# Patient Record
Sex: FEMALE | Race: OTHER | Employment: OTHER | URBAN - METROPOLITAN AREA
[De-identification: names, ages, dates, MRNs, and addresses within clinical notes are randomized per-mention and may not be internally consistent; named-entity substitution may affect disease eponyms.]

---

## 2021-11-17 DIAGNOSIS — Z11.3 SCREENING FOR STD (SEXUALLY TRANSMITTED DISEASE): ICD-10-CM

## 2021-11-18 LAB
HAV IGM SER IA-ACNC: NORMAL
HEPATITIS B CORE IGM ANTIBODY: NORMAL
HEPATITIS B SURFACE ANTIGEN INTERPRETATION: NORMAL
HEPATITIS C ANTIBODY INTERPRETATION: NORMAL
HIV-1 AND HIV-2 ANTIBODIES: NORMAL

## 2023-11-14 ENCOUNTER — APPOINTMENT (OUTPATIENT)
Dept: GENERAL RADIOLOGY | Age: 43
End: 2023-11-14
Payer: MEDICARE

## 2023-11-14 ENCOUNTER — APPOINTMENT (OUTPATIENT)
Dept: ULTRASOUND IMAGING | Age: 43
End: 2023-11-14
Payer: MEDICARE

## 2023-11-14 ENCOUNTER — HOSPITAL ENCOUNTER (EMERGENCY)
Age: 43
Discharge: HOME | End: 2023-11-14
Payer: MEDICARE

## 2023-11-14 VITALS
BODY MASS INDEX: 23.05 KG/M2 | OXYGEN SATURATION: 100 % | HEIGHT: 64 IN | RESPIRATION RATE: 18 BRPM | TEMPERATURE: 98.2 F | WEIGHT: 135 LBS | SYSTOLIC BLOOD PRESSURE: 147 MMHG | HEART RATE: 75 BPM | DIASTOLIC BLOOD PRESSURE: 98 MMHG

## 2023-11-14 DIAGNOSIS — M79.632 PAIN OF LEFT FOREARM: ICD-10-CM

## 2023-11-14 DIAGNOSIS — M77.9 TENDONITIS: Primary | ICD-10-CM

## 2023-11-14 PROCEDURE — 93971 EXTREMITY STUDY: CPT

## 2023-11-14 PROCEDURE — 99284 EMERGENCY DEPT VISIT MOD MDM: CPT

## 2023-11-14 PROCEDURE — 73090 X-RAY EXAM OF FOREARM: CPT

## 2023-11-14 RX ORDER — NAPROXEN 500 MG/1
500 TABLET ORAL 2 TIMES DAILY
Qty: 14 TABLET | Refills: 0 | Status: SHIPPED | OUTPATIENT
Start: 2023-11-14 | End: 2023-11-21

## 2023-11-14 RX ORDER — ACETAMINOPHEN 325 MG/1
650 TABLET ORAL ONCE
Status: DISCONTINUED | OUTPATIENT
Start: 2023-11-14 | End: 2023-11-14 | Stop reason: HOSPADM

## 2023-11-14 ASSESSMENT — PAIN DESCRIPTION - ORIENTATION: ORIENTATION: LEFT

## 2023-11-14 ASSESSMENT — PAIN SCALES - GENERAL: PAINLEVEL_OUTOF10: 7

## 2023-11-14 ASSESSMENT — PAIN DESCRIPTION - LOCATION: LOCATION: ARM

## 2023-11-14 ASSESSMENT — PAIN - FUNCTIONAL ASSESSMENT: PAIN_FUNCTIONAL_ASSESSMENT: 0-10

## 2023-11-15 NOTE — ED PROVIDER NOTES
Independent ARTUR Visit. [facility]   ED  Encounter Note  Admit Date/RoomTime: 2023  6:33 PM  ED Room: ANGELA/ANGELA    NAME: Brody Adrian  : 1980  MRN: [id number]     Chief Complaint:  Arm Pain (Left arm. Bump to forearm, no known injury.)    History of Present Illness       Celso Trevino is a 44 y.o. old female who presents to the emergency department by private vehicle, for non-traumatic Left forearm pain which occured 1 day(s) prior to arrival.  States she was in her yard doing yard work when the pain started. States it hurts whenever she uses it. Denies fall or trauma. Patient has no prior history of pain/injury with regards to today's visit. She is right handed. Since onset the symptoms have been remaining constant. Her pain is aggraveated by any use of and relieved by nothing. She denies any head injury, headache, loss of consciousness, fever, chills, NV, tingling, confusion, dizziness, neck pain, chest pain, SOB, abdominal pain, back pain, numbness, weakness, blurred vision, nausea, vomiting, fever, chills, wounds, or rash. She denies being a diabetic. ROS   Pertinent positives and negatives are stated within HPI, all other systems reviewed and are negative. Past Medical History:  has a past medical history of Acute appendicitis, Asthma, Headache(784.0), Iron deficiency anemia secondary to inadequate dietary iron intake, and Migraine. Surgical History:  has a past surgical history that includes  section (); Upper gastrointestinal endoscopy (2012); laparoscopic appendectomy (N/A, 10/28/2019); and Appendectomy. Social History:  reports that she has quit smoking. She has never used smokeless tobacco. She reports that she does not drink alcohol and does not use drugs.     Family History: family history includes Breast Cancer in her maternal grandmother; Diabetes in her maternal grandmother and niece;

## 2023-11-29 ENCOUNTER — OFFICE VISIT (OUTPATIENT)
Dept: PRIMARY CARE CLINIC | Age: 43
End: 2023-11-29
Payer: MEDICARE

## 2023-11-29 VITALS
HEART RATE: 76 BPM | BODY MASS INDEX: 22.71 KG/M2 | DIASTOLIC BLOOD PRESSURE: 80 MMHG | WEIGHT: 133 LBS | RESPIRATION RATE: 19 BRPM | TEMPERATURE: 98.1 F | SYSTOLIC BLOOD PRESSURE: 138 MMHG | HEIGHT: 64 IN | OXYGEN SATURATION: 99 %

## 2023-11-29 DIAGNOSIS — M79.672 ACUTE PAIN OF LEFT FOOT: ICD-10-CM

## 2023-11-29 DIAGNOSIS — L60.0 INGROWN NAIL OF GREAT TOE OF LEFT FOOT: Primary | ICD-10-CM

## 2023-11-29 PROCEDURE — 99214 OFFICE O/P EST MOD 30 MIN: CPT | Performed by: EMERGENCY MEDICINE

## 2023-11-29 PROCEDURE — 1036F TOBACCO NON-USER: CPT | Performed by: EMERGENCY MEDICINE

## 2023-11-29 PROCEDURE — G8427 DOCREV CUR MEDS BY ELIG CLIN: HCPCS | Performed by: EMERGENCY MEDICINE

## 2023-11-29 PROCEDURE — G8420 CALC BMI NORM PARAMETERS: HCPCS | Performed by: EMERGENCY MEDICINE

## 2023-11-29 PROCEDURE — G8484 FLU IMMUNIZE NO ADMIN: HCPCS | Performed by: EMERGENCY MEDICINE

## 2023-11-29 RX ORDER — CLARITHROMYCIN 500 MG/1
500 TABLET, COATED ORAL 2 TIMES DAILY
Qty: 20 TABLET | Refills: 0 | Status: SHIPPED | OUTPATIENT
Start: 2023-11-29 | End: 2023-12-09

## 2023-11-29 ASSESSMENT — ENCOUNTER SYMPTOMS
DIARRHEA: 0
WHEEZING: 0
EYE REDNESS: 0
SHORTNESS OF BREATH: 0
COUGH: 0
EYE PAIN: 0
BACK PAIN: 0
VOMITING: 0
ABDOMINAL DISTENTION: 0
NAUSEA: 0
SINUS PRESSURE: 0
EYE DISCHARGE: 0
SORE THROAT: 0

## 2023-11-29 NOTE — PROGRESS NOTES
reevaluation and treatment. Pt verbalizes understanding and is in agreement with plan of care. All questions answered.       New Medications     New Prescriptions    CLARITHROMYCIN (BIAXIN) 500 MG TABLET    Take 1 tablet by mouth 2 times daily for 10 days   Moist warm soaks  Tylenol prn    Electronically signed by Shoshana Whyte DO   DD: 11/29/23

## 2023-12-03 ENCOUNTER — APPOINTMENT (OUTPATIENT)
Dept: GENERAL RADIOLOGY | Age: 43
End: 2023-12-03
Payer: MEDICARE

## 2023-12-03 ENCOUNTER — HOSPITAL ENCOUNTER (EMERGENCY)
Age: 43
Discharge: HOME | End: 2023-12-03
Payer: MEDICARE

## 2023-12-03 VITALS
TEMPERATURE: 97.9 F | HEIGHT: 64 IN | RESPIRATION RATE: 18 BRPM | SYSTOLIC BLOOD PRESSURE: 117 MMHG | HEART RATE: 68 BPM | OXYGEN SATURATION: 100 % | BODY MASS INDEX: 22.2 KG/M2 | WEIGHT: 130 LBS | DIASTOLIC BLOOD PRESSURE: 89 MMHG

## 2023-12-03 DIAGNOSIS — L60.0 INGROWN NAIL OF GREAT TOE OF LEFT FOOT: Primary | ICD-10-CM

## 2023-12-03 PROCEDURE — 73630 X-RAY EXAM OF FOOT: CPT

## 2023-12-03 PROCEDURE — 99283 EMERGENCY DEPT VISIT LOW MDM: CPT

## 2023-12-03 PROCEDURE — 6370000000 HC RX 637 (ALT 250 FOR IP): Performed by: NURSE PRACTITIONER

## 2023-12-03 RX ORDER — IBUPROFEN 600 MG/1
600 TABLET ORAL ONCE
Status: COMPLETED | OUTPATIENT
Start: 2023-12-03 | End: 2023-12-03

## 2023-12-03 RX ORDER — NAPROXEN 500 MG/1
500 TABLET ORAL 2 TIMES DAILY
Qty: 14 TABLET | Refills: 0 | Status: SHIPPED | OUTPATIENT
Start: 2023-12-03 | End: 2023-12-10

## 2023-12-03 RX ADMIN — IBUPROFEN 600 MG: 600 TABLET, FILM COATED ORAL at 19:03

## 2023-12-03 ASSESSMENT — PAIN SCALES - GENERAL: PAINLEVEL_OUTOF10: 6

## 2023-12-03 NOTE — ED NOTES
Department of Emergency Medicine  FIRST PROVIDER TRIAGE NOTE             Independent MLP           12/3/23  5:11 PM EST    Date of Encounter: 12/3/23   MRN: [id number]      HPI: Tiffany Mathew is a 44 y.o. female who presents to the ED for No chief complaint on file. LEFT GREAT TOE INFECTION FROM INGROWN TOENAIL. WAS AT URGENT CARE ON MONDAY, WAS PLACED ON ANTIBIOTICS BUT NOT WORKING. ROS: Negative for cp or sob. PE: Gen Appearance/Constitutional: alert  HEENT: NC/NT. PERRLA,  Airway patent. Initial Plan of Care: All treatment areas with department are currently occupied. Plan to order/Initiate the following while awaiting opening in ED.   Initiate Treatment-Testing, Proceed toTreatment Area When Bed Available for ED Attending/MLP to Continue Care    Electronically signed by Mikell Nissen, APRN - CNP   DD: 12/3/23      Mikell Nissen, APRN - CNP  12/03/23 2215

## 2023-12-04 NOTE — ED PROVIDER NOTES
a postop shoe so her toe is not rubbing her shoe when she walks which is really causing her discomfort otherwise the toe is not rubbing his shoes she is comfortable. She will continue taking antibiotics and remains afebrile nontoxic in appearance and has no signs of septic joint. Patient was given Motrin and postop shoe for their symptoms with moderate improvement. Patient continues to be non-toxic on re-evaluation. Findings were discussed with the patient and reasons to immediately return to the ED were articulated to them. They will follow-up with their PMD and podiatry referral and will call Monday to see if she can get in to be seen. History from : Patient    Limitations to history : None    Discussion with Other Professionals : None    Social Determinants Significantly Affecting Health : None    Records Reviewed : Other urgent care records     Plan of Care/Counseling:  MAXWELL Poole CNP reviewed today's visit with the patient in addition to providing specific details for the plan of care and counseling regarding the diagnosis and prognosis. Questions are answered at this time and are agreeable with the plan. Assessment      1. Ingrown nail of great toe of left foot      Plan   Discharged home. Patient condition is good    New Medications     Discharge Medication List as of 12/3/2023  6:51 PM        Electronically signed by MAXWELL Poole CNP   DD: 12/4/23  **This report was transcribed using voice recognition software. Every effort was made to ensure accuracy; however, inadvertent computerized transcription errors may be present.   END OF ED PROVIDER NOTE             MAXWELL Poole CNP  12/04/23 0321

## 2023-12-06 ENCOUNTER — TELEPHONE (OUTPATIENT)
Dept: ADMINISTRATIVE | Age: 43
End: 2023-12-06

## 2023-12-06 NOTE — TELEPHONE ENCOUNTER
Pt was seen in [id number] [facility] ER on 12/3 for an ingrown lt toenail. She said her toe is painful and turning black. No availability, attempted to reach office and call would not go through. Pt would like scheduled at [facility] acute [facility] office. Please contact pt to schedule.

## 2024-01-05 ENCOUNTER — OFFICE VISIT (OUTPATIENT)
Dept: PODIATRY | Age: 44
End: 2024-01-05
Payer: MEDICARE

## 2024-01-05 VITALS — BODY MASS INDEX: 22.2 KG/M2 | WEIGHT: 130 LBS | HEIGHT: 64 IN

## 2024-01-05 DIAGNOSIS — R26.2 DIFFICULTY WALKING: ICD-10-CM

## 2024-01-05 DIAGNOSIS — L60.0 OC (ONYCHOCRYPTOSIS): Primary | ICD-10-CM

## 2024-01-05 PROCEDURE — G8420 CALC BMI NORM PARAMETERS: HCPCS | Performed by: PODIATRIST

## 2024-01-05 PROCEDURE — 1036F TOBACCO NON-USER: CPT | Performed by: PODIATRIST

## 2024-01-05 PROCEDURE — 99203 OFFICE O/P NEW LOW 30 MIN: CPT | Performed by: PODIATRIST

## 2024-01-05 PROCEDURE — G8427 DOCREV CUR MEDS BY ELIG CLIN: HCPCS | Performed by: PODIATRIST

## 2024-01-05 PROCEDURE — G8484 FLU IMMUNIZE NO ADMIN: HCPCS | Performed by: PODIATRIST

## 2024-01-05 NOTE — PROGRESS NOTES
24     Barby Pyle    : 1980 Sex: female   Age: 43 y.o.    Patient was referred by: None  Patient's PCP/Provider is:  Sylvia Kerr MD    Subjective:    Patient seen today for evaluation regarding left ingrown toenail issue    Chief Complaint   Patient presents with    New Patient     Left ingrown nail        HPI: Patient stated she did have issues several weeks ago.  She did go to the urgent care and had antibiotics prescribed.  Patient stated the antibiotics have significantly helped her symptoms.  She is having minimal issues at this time.  Patient denies any nausea, vomiting, fever, chills.  Patient is back in her regular shoe gear and performing daily activities to tolerance.  No other additional abnormalities noted.    ROS:  Const: Positives and pertinent negatives as per HPI.     Musculo: Denies symptoms other than stated above.  Neuro: Denies symptoms other than stated above.  Skin: Denies symptoms other than stated above.    Current Medications:    Current Outpatient Medications:     ibuprofen (ADVIL;MOTRIN) 600 MG tablet, Take 1 tablet by mouth 3 times daily as needed for Pain, Disp: 30 tablet, Rfl: 0    pantoprazole (PROTONIX) 40 MG tablet, take 1 tablet by mouth once daily, Disp: 90 tablet, Rfl: 1    SUMAtriptan (IMITREX) 50 MG tablet, Take 1 tablet by mouth as needed for Migraine, Disp: 30 tablet, Rfl: 1    albuterol sulfate HFA (VENTOLIN HFA) 108 (90 Base) MCG/ACT inhaler, Inhale 2 puffs into the lungs 4 times daily as needed for Wheezing, Disp: 18 g, Rfl: 3    diclofenac sodium (VOLTAREN) 1 % GEL, Apply 2 g topically 4 times daily, Disp: 150 g, Rfl: 3    vitamin D3 (CHOLECALCIFEROL) 25 MCG (1000 UT) TABS tablet, Take 1 tablet by mouth daily, Disp: 90 tablet, Rfl: 1    Omega-3 Fatty Acids (FISH OIL) 1000 MG capsule, Take 1 capsule by mouth daily, Disp: 90 capsule, Rfl: 1    dicyclomine (BENTYL) 10 MG capsule, Take 2 capsules by mouth 4 times daily as needed (pain), Disp:

## 2024-01-05 NOTE — PROGRESS NOTES
Barby is here today as a new patient for an evaluation of her left great nail.  She believes she may have an ingrown nail.  She has been having pain to the toe for the past month.  She went to ER on 12/3/23 and was given an antibiotic and states it is a little better since then.  PCP is Dr. Sylvia Kerr, last seen 11/15/2023.

## 2024-03-26 ENCOUNTER — TELEPHONE (OUTPATIENT)
Dept: ADMINISTRATIVE | Age: 44
End: 2024-03-26

## 2024-03-26 NOTE — TELEPHONE ENCOUNTER
Pt's girlfriend called to schedule an appt with Dr. Garcia.  Pt has had pressure in her vagina x 2 weeks.  No availability. Staff unavailable due to pt care.  Please contact pt at [phone].

## 2024-04-09 ASSESSMENT — PATIENT HEALTH QUESTIONNAIRE - PHQ9
SUM OF ALL RESPONSES TO PHQ QUESTIONS 1-9: 0
SUM OF ALL RESPONSES TO PHQ QUESTIONS 1-9: 0
2. FEELING DOWN, DEPRESSED OR HOPELESS: NOT AT ALL
SUM OF ALL RESPONSES TO PHQ QUESTIONS 1-9: 0
1. LITTLE INTEREST OR PLEASURE IN DOING THINGS: NOT AT ALL
SUM OF ALL RESPONSES TO PHQ9 QUESTIONS 1 & 2: 0
1. LITTLE INTEREST OR PLEASURE IN DOING THINGS: NOT AT ALL
SUM OF ALL RESPONSES TO PHQ QUESTIONS 1-9: 0
SUM OF ALL RESPONSES TO PHQ9 QUESTIONS 1 & 2: 0
2. FEELING DOWN, DEPRESSED OR HOPELESS: NOT AT ALL

## 2024-04-10 ENCOUNTER — OFFICE VISIT (OUTPATIENT)
Dept: OBGYN | Age: 44
End: 2024-04-10
Payer: MEDICARE

## 2024-04-10 VITALS
HEIGHT: 64 IN | WEIGHT: 142 LBS | DIASTOLIC BLOOD PRESSURE: 64 MMHG | HEART RATE: 69 BPM | SYSTOLIC BLOOD PRESSURE: 139 MMHG | BODY MASS INDEX: 24.24 KG/M2

## 2024-04-10 DIAGNOSIS — Z12.31 ENCOUNTER FOR SCREENING MAMMOGRAM FOR BREAST CANCER: Primary | ICD-10-CM

## 2024-04-10 DIAGNOSIS — Z12.4 SCREENING FOR CERVICAL CANCER: ICD-10-CM

## 2024-04-10 DIAGNOSIS — Z01.419 ENCOUNTER FOR WELL WOMAN EXAM: ICD-10-CM

## 2024-04-10 PROCEDURE — 99396 PREV VISIT EST AGE 40-64: CPT | Performed by: OBSTETRICS & GYNECOLOGY

## 2024-04-10 SDOH — ECONOMIC STABILITY: HOUSING INSECURITY
IN THE LAST 12 MONTHS, WAS THERE A TIME WHEN YOU DID NOT HAVE A STEADY PLACE TO SLEEP OR SLEPT IN A SHELTER (INCLUDING NOW)?: NO

## 2024-04-10 SDOH — ECONOMIC STABILITY: FOOD INSECURITY: WITHIN THE PAST 12 MONTHS, THE FOOD YOU BOUGHT JUST DIDN'T LAST AND YOU DIDN'T HAVE MONEY TO GET MORE.: NEVER TRUE

## 2024-04-10 SDOH — ECONOMIC STABILITY: FOOD INSECURITY: WITHIN THE PAST 12 MONTHS, YOU WORRIED THAT YOUR FOOD WOULD RUN OUT BEFORE YOU GOT MONEY TO BUY MORE.: NEVER TRUE

## 2024-04-10 SDOH — ECONOMIC STABILITY: INCOME INSECURITY: HOW HARD IS IT FOR YOU TO PAY FOR THE VERY BASICS LIKE FOOD, HOUSING, MEDICAL CARE, AND HEATING?: NOT HARD AT ALL

## 2024-04-10 NOTE — PROGRESS NOTES
Patient alert and pleasant with no complaints.  Here today for annual GYN exam.  Pelvic exam, pap smear obtained, labeled  and delivered to lab.  Breast exam completed by doctor.   Discharge instructions have been discussed with the patient. Patient advised to call our office with any questions or concerns.   Voiced understanding.

## 2024-04-10 NOTE — PROGRESS NOTES
Barby Pyle     Patient presents for annual exam.     Patient had an ASCUS pap smear with negative HPV 2023. Discussed cotesting in 2  years. Patient would like a pap smear today.     No complaints.     Past Medical History:   Diagnosis Date    Acute appendicitis 10/28/2019    Asthma     Headache(784.0)     8 to 1010 severity, forehead    Iron deficiency anemia secondary to inadequate dietary iron intake     Migraine     8 to 1010 severity        Past Surgical History:   Procedure Laterality Date    APPENDECTOMY       SECTION      LAPAROSCOPIC APPENDECTOMY N/A 10/28/2019    APPENDECTOMY LAPAROSCOPIC performed by Nikita GOLDBERG MD at [facility] OR    UPPER GASTROINTESTINAL ENDOSCOPY  2012        Family History   Problem Relation Age of Onset    Heart Disease Mother     Hypertension Mother     Breast Cancer Maternal Grandmother     Diabetes Maternal Grandmother     Lupus Cousin     Diabetes Niece           Current Outpatient Medications:     ibuprofen (ADVIL;MOTRIN) 600 MG tablet, Take 1 tablet by mouth 3 times daily as needed for Pain, Disp: 30 tablet, Rfl: 0    pantoprazole (PROTONIX) 40 MG tablet, take 1 tablet by mouth once daily, Disp: 90 tablet, Rfl: 1    SUMAtriptan (IMITREX) 50 MG tablet, Take 1 tablet by mouth as needed for Migraine, Disp: 30 tablet, Rfl: 1    albuterol sulfate HFA (VENTOLIN HFA) 108 (90 Base) MCG/ACT inhaler, Inhale 2 puffs into the lungs 4 times daily as needed for Wheezing, Disp: 18 g, Rfl: 3    diclofenac sodium (VOLTAREN) 1 % GEL, Apply 2 g topically 4 times daily, Disp: 150 g, Rfl: 3    dicyclomine (BENTYL) 10 MG capsule, Take 2 capsules by mouth 4 times daily as needed (pain), Disp: 20 capsule, Rfl: none    naproxen (NAPROSYN) 500 MG tablet, Take 1 tablet by mouth 2 times daily for 7 days, Disp: 14 tablet, Rfl: 0    vitamin D3 (CHOLECALCIFEROL) 25 MCG (1000 UT) TABS tablet, Take 1 tablet by mouth daily (Patient not taking: Reported on 4/10/2024), Disp: 90

## 2024-04-16 LAB — GYNECOLOGY CYTOLOGY REPORT: NORMAL

## 2024-04-23 LAB
GYNECOLOGY CYTOLOGY REPORT: NORMAL
HPV SAMPLE: NORMAL
HPV SOURCE: NORMAL
HPV, GENOTYPE 16: NOT DETECTED
HPV, GENOTYPE 18: NOT DETECTED
HPV, HIGH RISK OTHER: NOT DETECTED
HPV, INTERPRETATION: NORMAL

## 2024-05-29 ENCOUNTER — TELEPHONE (OUTPATIENT)
Dept: INTERNAL MEDICINE | Age: 44
End: 2024-05-29

## 2024-05-29 NOTE — TELEPHONE ENCOUNTER
----- Message from Kristine Treviño sent at 5/29/2024  3:23 PM EDT -----  Regarding: E[facility] Appointment Request  E[facility] Appointment Request    Patient needs appointment for ECC Appointment Type: Existing Condition Follow Up.    Reason for Appointment Request: No appointments available during search. PT would like to reschedule her appt on 6/5/2024 8:30 AM on 6/6/2024 Morning because she needsto bring her mom to a procedure on the 5th  --------------------------------------------------------------------------------------------------------------------------    Relationship to Patient: Self     Call Back Information: OK to leave message on voicemail  Preferred Call Back Number: Phone [phone] or [phone]

## 2024-05-29 NOTE — TELEPHONE ENCOUNTER
Left message in regards to changing appt. For 6/6/24 instructed to call us back we have 2 available slots tor 8:30 a.m.   Detail Level: Generalized Detail Level: Zone Detail Level: Detailed

## 2024-06-13 ENCOUNTER — HOSPITAL ENCOUNTER (OUTPATIENT)
Age: 44
Discharge: HOME | End: 2024-06-13
Payer: MEDICARE

## 2024-06-13 ENCOUNTER — HOSPITAL ENCOUNTER (OUTPATIENT)
Age: 44
Discharge: HOME | End: 2024-06-15
Payer: MEDICARE

## 2024-06-13 ENCOUNTER — OFFICE VISIT (OUTPATIENT)
Dept: INTERNAL MEDICINE | Age: 44
End: 2024-06-13
Payer: MEDICARE

## 2024-06-13 VITALS
HEIGHT: 64 IN | OXYGEN SATURATION: 97 % | DIASTOLIC BLOOD PRESSURE: 79 MMHG | RESPIRATION RATE: 18 BRPM | WEIGHT: 138.2 LBS | TEMPERATURE: 98.2 F | BODY MASS INDEX: 23.6 KG/M2 | SYSTOLIC BLOOD PRESSURE: 124 MMHG | HEART RATE: 68 BPM

## 2024-06-13 DIAGNOSIS — R42 DIZZINESS: Primary | ICD-10-CM

## 2024-06-13 DIAGNOSIS — S46.912A UPPER EXTREMITY TENDON TEAR, LEFT, INITIAL ENCOUNTER: ICD-10-CM

## 2024-06-13 DIAGNOSIS — E78.2 MIXED HYPERLIPIDEMIA: ICD-10-CM

## 2024-06-13 DIAGNOSIS — R42 DIZZINESS: ICD-10-CM

## 2024-06-13 DIAGNOSIS — E78.5 HYPERLIPIDEMIA, UNSPECIFIED HYPERLIPIDEMIA TYPE: ICD-10-CM

## 2024-06-13 DIAGNOSIS — K29.70 GASTRITIS WITHOUT BLEEDING, UNSPECIFIED CHRONICITY, UNSPECIFIED GASTRITIS TYPE: ICD-10-CM

## 2024-06-13 DIAGNOSIS — G43.909 MIGRAINE WITHOUT STATUS MIGRAINOSUS, NOT INTRACTABLE, UNSPECIFIED MIGRAINE TYPE: ICD-10-CM

## 2024-06-13 DIAGNOSIS — J45.909 ASTHMA, UNSPECIFIED ASTHMA SEVERITY, UNSPECIFIED WHETHER COMPLICATED, UNSPECIFIED WHETHER PERSISTENT: ICD-10-CM

## 2024-06-13 LAB
ALBUMIN SERPL-MCNC: 4.2 G/DL (ref 3.5–5.2)
ALP SERPL-CCNC: 50 U/L (ref 35–104)
ALT SERPL-CCNC: 11 U/L (ref 0–32)
ANION GAP SERPL CALCULATED.3IONS-SCNC: 14 MMOL/L (ref 7–16)
AST SERPL-CCNC: 12 U/L (ref 0–31)
BASOPHILS # BLD: 0.04 K/UL (ref 0–0.2)
BASOPHILS NFR BLD: 1 % (ref 0–2)
BILIRUB SERPL-MCNC: 0.5 MG/DL (ref 0–1.2)
BUN SERPL-MCNC: 8 MG/DL (ref 6–20)
CALCIUM SERPL-MCNC: 9.1 MG/DL (ref 8.6–10.2)
CHLORIDE SERPL-SCNC: 104 MMOL/L (ref 98–107)
CO2 SERPL-SCNC: 22 MMOL/L (ref 22–29)
CREAT SERPL-MCNC: 0.8 MG/DL (ref 0.5–1)
EOSINOPHIL # BLD: 0.06 K/UL (ref 0.05–0.5)
EOSINOPHILS RELATIVE PERCENT: 1 % (ref 0–6)
ERYTHROCYTE [DISTWIDTH] IN BLOOD BY AUTOMATED COUNT: 13.3 % (ref 11.5–15)
GFR, ESTIMATED: >90 ML/MIN/1.73M2
GLUCOSE SERPL-MCNC: 87 MG/DL (ref 74–99)
HCT VFR BLD AUTO: 42.3 % (ref 34–48)
HGB BLD-MCNC: 13.9 G/DL (ref 11.5–15.5)
IMM GRANULOCYTES # BLD AUTO: <0.03 K/UL (ref 0–0.58)
IMM GRANULOCYTES NFR BLD: 0 % (ref 0–5)
LYMPHOCYTES NFR BLD: 2.45 K/UL (ref 1.5–4)
LYMPHOCYTES RELATIVE PERCENT: 33 % (ref 20–42)
MCH RBC QN AUTO: 29.4 PG (ref 26–35)
MCHC RBC AUTO-ENTMCNC: 32.9 G/DL (ref 32–34.5)
MCV RBC AUTO: 89.6 FL (ref 80–99.9)
MONOCYTES NFR BLD: 0.73 K/UL (ref 0.1–0.95)
MONOCYTES NFR BLD: 10 % (ref 2–12)
NEUTROPHILS NFR BLD: 56 % (ref 43–80)
NEUTS SEG NFR BLD: 4.2 K/UL (ref 1.8–7.3)
PLATELET # BLD AUTO: 251 K/UL (ref 130–450)
PMV BLD AUTO: 10.1 FL (ref 7–12)
POTASSIUM SERPL-SCNC: 4 MMOL/L (ref 3.5–5)
PROT SERPL-MCNC: 6.6 G/DL (ref 6.4–8.3)
RBC # BLD AUTO: 4.72 M/UL (ref 3.5–5.5)
SODIUM SERPL-SCNC: 140 MMOL/L (ref 132–146)
TSH SERPL DL<=0.05 MIU/L-ACNC: 1.19 UIU/ML (ref 0.27–4.2)
WBC OTHER # BLD: 7.5 K/UL (ref 4.5–11.5)

## 2024-06-13 PROCEDURE — 99213 OFFICE O/P EST LOW 20 MIN: CPT

## 2024-06-13 PROCEDURE — 80053 COMPREHEN METABOLIC PANEL: CPT

## 2024-06-13 PROCEDURE — 85025 COMPLETE CBC W/AUTO DIFF WBC: CPT

## 2024-06-13 PROCEDURE — G8420 CALC BMI NORM PARAMETERS: HCPCS

## 2024-06-13 PROCEDURE — 1036F TOBACCO NON-USER: CPT

## 2024-06-13 PROCEDURE — G8427 DOCREV CUR MEDS BY ELIG CLIN: HCPCS

## 2024-06-13 PROCEDURE — 84443 ASSAY THYROID STIM HORMONE: CPT

## 2024-06-13 PROCEDURE — 36415 COLL VENOUS BLD VENIPUNCTURE: CPT

## 2024-06-13 RX ORDER — PANTOPRAZOLE SODIUM 40 MG/1
TABLET, DELAYED RELEASE ORAL
Qty: 90 TABLET | Refills: 1 | Status: SHIPPED | OUTPATIENT
Start: 2024-06-13

## 2024-06-13 RX ORDER — ALBUTEROL SULFATE 90 UG/1
2 AEROSOL, METERED RESPIRATORY (INHALATION) 4 TIMES DAILY PRN
Qty: 18 G | Refills: 3 | Status: CANCELLED | OUTPATIENT
Start: 2024-06-13

## 2024-06-13 RX ORDER — CHLORAL HYDRATE 500 MG
1000 CAPSULE ORAL DAILY
Qty: 90 CAPSULE | Refills: 1 | Status: SHIPPED | OUTPATIENT
Start: 2024-06-13

## 2024-06-13 RX ORDER — SUMATRIPTAN 50 MG/1
50 TABLET, FILM COATED ORAL PRN
Qty: 30 TABLET | Refills: 1 | Status: SHIPPED | OUTPATIENT
Start: 2024-06-13

## 2024-06-13 RX ORDER — TOPIRAMATE 25 MG/1
25 TABLET ORAL 2 TIMES DAILY
Qty: 60 TABLET | Refills: 3 | Status: SHIPPED | OUTPATIENT
Start: 2024-06-13

## 2024-06-13 RX ORDER — ALBUTEROL SULFATE 90 UG/1
2 AEROSOL, METERED RESPIRATORY (INHALATION) 4 TIMES DAILY PRN
Qty: 18 G | Refills: 3 | Status: SHIPPED | OUTPATIENT
Start: 2024-06-13

## 2024-06-13 ASSESSMENT — ENCOUNTER SYMPTOMS
WHEEZING: 0
VOMITING: 0
SORE THROAT: 0
ABDOMINAL DISTENTION: 0
BACK PAIN: 0
SHORTNESS OF BREATH: 0
BLOOD IN STOOL: 0
ABDOMINAL PAIN: 0
NAUSEA: 0
COUGH: 0
DIARRHEA: 0

## 2024-06-13 NOTE — PATIENT INSTRUCTIONS
Dear Barby Pyle,      Thank you for coming to your appointment today. I hope we have addressed all of your needs.     Please make sure to do the following:  Continue your medications as listed.  In this visit, the following additional orders/instructions were discussed:  I have referred you to neurologist for further evaluation of the uncontrolled migraine episodes, you will receive a call to schedule an appointment  Get ordered labs drawn before our next follow up.  Continue to exercise to maintain good health      Other Follow-Ups:    No future appointments.    If a referral was placed on your behalf during your visit, you should expect to receive information about the date and time of your referral appointment within 7-10 days. If not, please call the office at [phone] and ask to speak to the .    Should you have further questions in regards to this visit, you can review your clinical note and after visit summary document on your [employer] account.  Other questions can be directed to our nurse line at [phone].     Discharge instruction reviewed with Patient. Patient verbalizes understanding.      Sincerely,  Dayo Roe M.D PGY-2  6/13/2024  9:04 AM T

## 2024-06-13 NOTE — PROGRESS NOTES
[facility]  Internal Medicine Residency Program  ACC Note      SUBJECTIVE:  CC: had concerns including Dizziness.    HPI:Barby Pyle is a 43 y.o. female who  has a past medical history of Acute appendicitis, Asthma, Headache(784.0), Iron deficiency anemia secondary to inadequate dietary iron intake, and Migraine.     Barby Pyle is here today for urgent care complaining of dizziness that has been ongoing for couple of weeks. Patient also reports frequent headaches, previous history of migraine and on sumatriptan and states she takes sumatriptan almost every day without relief of the headaches. Headache described as bandlike across the head however was in the morning. Denies nausea or vomiting.  She also reports episodes of waking up and noticing her tongue has been beating, last episode was about a week ago.  Denies any history of seizure or visit to neurology in the past. Denies any focal neurologic weakness or gait disturbance. Denies illicit drug use, alcohol abuse or smoking.  No other chronic medical history other than asthma.    Other chronic medical conditions are stable at this time    Health Maintenance Due   Topic Date Due    Hepatitis B vaccine (1 of 3 - 3-dose series) Never done    COVID-19 Vaccine (1) Never done    Pneumococcal 0-64 years Vaccine (1 of 2 - PCV) Never done    Annual Wellness Visit (Medicare Advantage)  Never done     Lab Results   Component Value Date/Time    LABA1C 5.1 10/05/2022 09:27 AM       Allergies   Allergen Reactions    Asa [Aspirin]     Pcn [Penicillins] Other (See Comments)     syncope         Review of Systems   Constitutional:  Negative for activity change, appetite change, chills, fever and unexpected weight change.   HENT:  Negative for congestion and sore throat.    Eyes:  Negative for visual disturbance.   Respiratory:  Negative for cough, shortness of breath and wheezing.    Cardiovascular:  Negative for chest pain, palpitations  rhythm, S1, S2 normal, no murmur, click, rub or gallop  Abdomen: soft, non-tender; bowel sounds normal; no masses,  no organomegaly  Extremities:  extremities normal, atraumatic, no cyanosis or edema  Musculokeletal: No joint swelling, no muscle tenderness. ROM normal in all joints of extremities.   Neurologic: Mental status: Alert, oriented, thought content appropriate  PERRL, conjunctiva normal   No facial asymmetry  No gait disturbance  No cerebellar signs  Muscle strength normal and equal globally    ASSESSMENT/PLAN:  Dizziness  -     CBC with Auto Differential; Future  -     Comprehensive Metabolic Panel; Future  -     TSH; Future    Migraine without status migrainosus, not intractable, unspecified migraine type  -     SUMAtriptan (IMITREX) 50 MG tablet  -  topiramate 25 mg BID ordered and will titrate as needed; no history of renal stone   -     Edilma Daigle MD, Neurology, [place]    Asthma, unspecified asthma severity, unspecified whether complicated, unspecified whether persistent  -     albuterol sulfate HFA (VENTOLIN HFA) 108 (90 Base) MCG/ACT inhaler; Inhale 2 puffs into the lungs 4 times daily as needed for Wheezing, Disp-18 g, R-3Normal    Hyperlipidemia, unspecified hyperlipidemia type  -     Omega-3 Fatty Acids (FISH OIL) 1000 MG capsule; Take 1 capsule by mouth daily, Disp-90 capsule, R-1Normal    Gastritis without bleeding, unspecified chronicity, unspecified gastritis type  -     pantoprazole (PROTONIX) 40 MG tablet; take 1 tablet by mouth once daily, Disp-90 tablet, R-1Normal    Mixed hyperlipidemia  -     TSH; Future       RTC:  Return for PCP follow up.    I have reviewed my findings and recommendations with Barby Pyle and Dr Huerta.    Dayo Roe MD, MPH, PGY-2  Internal Medicine Residency  6/13/2024 6:23 PM

## 2024-06-13 NOTE — PROGRESS NOTES
[facility]  Internal Medicine Residency Clinic    Attending Physician Statement  I have discussed the case, including pertinent history and exam findings with the resident physician.  I agree with the assessment, plan and orders as documented by the resident. I have reviewed all pertinent PMHx, PSHx, FamHx, SocialHx, medications, and allergies and updated history as appropriate.    Patient here for routine follow up of medical problems.     Dizziness  -Patient having dizziness with migraines  -patient states that she has been having daily headaches; discussed to wean off the sumatriptan   -plan to start patient on depakote daily for migraine control; sue has trialed depakote, topamax, NSAIDs and tylenol in the past without improvement; referral for evaluation of CGRP vs botox injections with neurology   -MSR 5/5 in BL UE and LE BL, DTR 2/4 in BL UE and LE; sensation grossly intact, FTN and HTS testing intact BL, CN II -XII grossly intact   -topiramate 25 mg BID ordered and will titrate as needed; no history of renal stones     Remainder of medical problems as per resident note.    Rickie Huerta Jr, DO  6/13/24

## 2024-08-21 ENCOUNTER — TELEMEDICINE (OUTPATIENT)
Age: 44
End: 2024-08-21

## 2024-08-21 DIAGNOSIS — G43.709 CHRONIC MIGRAINE W/O AURA W/O STATUS MIGRAINOSUS, NOT INTRACTABLE: Primary | ICD-10-CM

## 2024-08-21 DIAGNOSIS — K14.8 TONGUE BITING: ICD-10-CM

## 2024-08-21 DIAGNOSIS — G44.40 REBOUND HEADACHE: ICD-10-CM

## 2024-08-21 RX ORDER — MAGNESIUM GLUCONATE 27 MG(500)
500 TABLET ORAL DAILY
Qty: 30 TABLET | Refills: 3 | Status: SHIPPED | OUTPATIENT
Start: 2024-08-21

## 2024-08-21 RX ORDER — RIZATRIPTAN BENZOATE 10 MG/1
10 TABLET ORAL PRN
Qty: 9 TABLET | Refills: 3 | Status: SHIPPED | OUTPATIENT
Start: 2024-08-21

## 2024-08-21 RX ORDER — METHYLPREDNISOLONE 4 MG/1
TABLET ORAL
Qty: 22 TABLET | Refills: 0 | Status: SHIPPED | OUTPATIENT
Start: 2024-08-21 | End: 2024-08-27

## 2024-08-21 RX ORDER — RIBOFLAVIN (VITAMIN B2) 100 MG
200 TABLET ORAL 2 TIMES DAILY
Qty: 120 TABLET | Refills: 3 | Status: SHIPPED | OUTPATIENT
Start: 2024-08-21

## 2024-08-21 RX ORDER — PROPRANOLOL HYDROCHLORIDE 10 MG/1
10 TABLET ORAL 2 TIMES DAILY
Qty: 60 TABLET | Refills: 3 | Status: SHIPPED | OUTPATIENT
Start: 2024-08-21

## 2024-08-21 NOTE — PROGRESS NOTES
Edilma Mccallum MD       Barby Pyle is a 43 y.o. female presenting as a new patient for a   Chief Complaint   Patient presents with    New Patient    Migraine        2 episodes of tongue bite  In sleep    Headaches:  Chronicity: chronic    Onset: chronic   Progression since onset: worsening    Q daily headaches  Comes daily  Wakes up with it  Goes to bed with it  Pressure like pain  Pressure like and throbbing pain  8/10    Severe migraine- imitrex prn does not help  Needs to go to ER  Has photophobia, phonophobia  Needs to go to bed  Has nausea  Vomiting- none    Dizziness: yes.   No syncope    Visual auras: none  Sensory auras: none    Treatment: topamax 25 mg qhs- does not want to try   On albuterol- ashthma    Uses imitrex prn          Past Medical History:   Diagnosis Date    Acute appendicitis 10/28/2019    Asthma     Headache(784.0)     8 to 1010 severity, forehead    Iron deficiency anemia secondary to inadequate dietary iron intake     Migraine     8 to 1010 severity    Migraine      Past Surgical History:   Procedure Laterality Date    APPENDECTOMY       SECTION  2006    LAPAROSCOPIC APPENDECTOMY N/A 10/28/2019    APPENDECTOMY LAPAROSCOPIC performed by Nikita GOLDBERG MD at [facility] OR    UPPER GASTROINTESTINAL ENDOSCOPY  2012     Social History     Socioeconomic History    Marital status: Single    Number of children: 2   Tobacco Use    Smoking status: Former    Smokeless tobacco: Never   Vaping Use    Vaping status: Never Used   Substance and Sexual Activity    Alcohol use: No    Drug use: No    Sexual activity: Yes     Partners: Female     Social Determinants of Health     Financial Resource Strain: Low Risk  (4/10/2024)    Overall Financial Resource Strain (CARDIA)     Difficulty of Paying Living Expenses: Not hard at all   Food Insecurity: No Food Insecurity (4/10/2024)    Hunger Vital Sign     Worried About Running Out of Food in the Last Year: Never true     Ran Out of

## 2024-08-29 ENCOUNTER — HOSPITAL ENCOUNTER (EMERGENCY)
Age: 44
Discharge: HOME | End: 2024-08-29
Payer: MEDICARE

## 2024-08-29 ENCOUNTER — APPOINTMENT (OUTPATIENT)
Dept: GENERAL RADIOLOGY | Age: 44
End: 2024-08-29
Payer: MEDICARE

## 2024-08-29 VITALS
DIASTOLIC BLOOD PRESSURE: 91 MMHG | HEIGHT: 64 IN | HEART RATE: 64 BPM | WEIGHT: 140 LBS | SYSTOLIC BLOOD PRESSURE: 139 MMHG | TEMPERATURE: 98.2 F | RESPIRATION RATE: 16 BRPM | OXYGEN SATURATION: 100 % | BODY MASS INDEX: 23.9 KG/M2
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
## 2024-08-29 DIAGNOSIS — S51.851A DOG BITE OF RIGHT FOREARM WITHOUT COMPLICATION, INITIAL ENCOUNTER: Primary | ICD-10-CM

## 2024-08-29 PROCEDURE — 73090 X-RAY EXAM OF FOREARM: CPT

## 2024-08-29 PROCEDURE — 6370000000 HC RX 637 (ALT 250 FOR IP): Performed by: NURSE PRACTITIONER

## 2024-08-29 PROCEDURE — 99283 EMERGENCY DEPT VISIT LOW MDM: CPT

## 2024-08-29 RX ORDER — SULFAMETHOXAZOLE/TRIMETHOPRIM 800-160 MG
1 TABLET ORAL ONCE
Status: COMPLETED | OUTPATIENT
Start: 2024-08-29 | End: 2024-08-29

## 2024-08-29 RX ORDER — CLINDAMYCIN HCL 300 MG
300 CAPSULE ORAL 3 TIMES DAILY
Qty: 15 CAPSULE | Refills: 0 | Status: SHIPPED | OUTPATIENT
Start: 2024-08-29 | End: 2024-09-03

## 2024-08-29 RX ORDER — CLINDAMYCIN HCL 150 MG
300 CAPSULE ORAL ONCE
Status: COMPLETED | OUTPATIENT
Start: 2024-08-29 | End: 2024-08-29

## 2024-08-29 RX ORDER — SULFAMETHOXAZOLE/TRIMETHOPRIM 800-160 MG
1 TABLET ORAL 2 TIMES DAILY
Qty: 10 TABLET | Refills: 0 | Status: SHIPPED | OUTPATIENT
Start: 2024-08-29 | End: 2024-09-03

## 2024-08-29 RX ADMIN — CLINDAMYCIN HYDROCHLORIDE 300 MG: 150 CAPSULE ORAL at 21:47

## 2024-08-29 RX ADMIN — SULFAMETHOXAZOLE AND TRIMETHOPRIM 1 TABLET: 800; 160 TABLET ORAL at 21:47

## 2024-08-29 ASSESSMENT — PAIN - FUNCTIONAL ASSESSMENT: PAIN_FUNCTIONAL_ASSESSMENT: NONE - DENIES PAIN

## 2024-08-29 ASSESSMENT — LIFESTYLE VARIABLES
HOW MANY STANDARD DRINKS CONTAINING ALCOHOL DO YOU HAVE ON A TYPICAL DAY: PATIENT DOES NOT DRINK
HOW OFTEN DO YOU HAVE A DRINK CONTAINING ALCOHOL: NEVER

## 2024-08-29 NOTE — ED PROVIDER NOTES
Independent ARTUR Visit.        [facility] EMERGENCY DEPARTMENT  ED  Encounter Note  Admit Date/RoomTime: 2024  6:52 PM  ED Room: 3232  NAME: Barby Pyle  : 1980  MRN: [id number]  PCP: Lexi Bryant MD    CHIEF COMPLAINT     Animal Bite (Right wrist today by other's dog, owner states shots utd / utd on tetanus )    HISTORY OF PRESENT ILLNESS        Barby Pyle is a 44 y.o. female who presents to the ED with complaints of a dog bite to the right forearm.  Patient states she was walking on the road when another person was walking the other way with their dog on a leash.  Patient states she left [place] that she went down to pet the dog and the dog bit her once on the forearm.  Patient states she is up-to-date on tetanus.  Patient states the owner stated that his dog is up-to-date on its rabies vaccine.  Patient states the dog did not appear sick and looked well.  Patient has no concerns for rabies, but wanted to make sure that the right forearm did not get infected.  REVIEW OF SYSTEMS     Pertinent positives and negatives are stated within HPI, all other systems reviewed and are negative.    Past Medical History:  has a past medical history of Acute appendicitis, Asthma, Headache(784.0), Iron deficiency anemia secondary to inadequate dietary iron intake, Migraine, and Migraine.  Surgical History:  has a past surgical history that includes  section (); Upper gastrointestinal endoscopy (2012); laparoscopic appendectomy (N/A, 10/28/2019); and Appendectomy.  Social History:  reports that she has quit smoking. She has never used smokeless tobacco. She reports that she does not drink alcohol and does not use drugs.  Family History: family history includes Breast Cancer in her maternal grandmother; Diabetes in her maternal grandmother and niece; Heart Disease in her mother; Hypertension in her mother; Lupus in her cousin.   Allergies: Asa  [aspirin] and Pcn [penicillins]  CURRENT MEDICATIONS       Discharge Medication List as of 8/29/2024  9:53 PM        CONTINUE these medications which have NOT CHANGED    Details   vitamin B-2 (RIBOFLAVIN) 100 MG TABS tablet Take 2 tablets by mouth in the morning and at bedtime, Disp-120 tablet, R-3Normal      magnesium gluconate (MAGONATE) 500 MG tablet Take 1 tablet by mouth daily, Disp-30 tablet, R-3Normal      propranolol (INDERAL) 10 MG tablet Take 1 tablet by mouth 2 times daily, Disp-60 tablet, R-3Normal      rizatriptan (MAXALT) 10 MG tablet Take 1 tablet by mouth as needed for Migraine May repeat in 2 hours if needed, Disp-9 tablet, R-3Normal      Omega-3 Fatty Acids (FISH OIL) 1000 MG capsule Take 1 capsule by mouth daily, Disp-90 capsule, R-1Normal      pantoprazole (PROTONIX) 40 MG tablet take 1 tablet by mouth once daily, Disp-90 tablet, R-1Normal      SUMAtriptan (IMITREX) 50 MG tablet Take 1 tablet by mouth as needed for Migraine, Disp-30 tablet, R-1Normal      albuterol sulfate HFA (VENTOLIN HFA) 108 (90 Base) MCG/ACT inhaler Inhale 2 puffs into the lungs 4 times daily as needed for Wheezing, Disp-18 g, R-3Normal      topiramate (TOPAMAX) 25 MG tablet Take 1 tablet by mouth 2 times daily, Disp-60 tablet, R-3Normal      naproxen (NAPROSYN) 500 MG tablet Take 1 tablet by mouth 2 times daily for 7 days, Disp-14 tablet, R-0Normal      ibuprofen (ADVIL;MOTRIN) 600 MG tablet Take 1 tablet by mouth 3 times daily as needed for Pain, Disp-30 tablet, R-0Normal      diclofenac sodium (VOLTAREN) 1 % GEL Apply 2 g topically 4 times daily, Topical, 4 TIMES DAILY Starting Tue 8/22/2023, Disp-150 g, R-3, Normal      vitamin D3 (CHOLECALCIFEROL) 25 MCG (1000 UT) TABS tablet Take 1 tablet by mouth daily, Disp-90 tablet, R-1Normal      dicyclomine (BENTYL) 10 MG capsule Take 2 capsules by mouth 4 times daily as needed (pain), Disp-20 capsule, R-nonePrint             SCREENINGS     [place] Coma Scale  Eye Opening:

## 2024-10-24 ENCOUNTER — HOSPITAL ENCOUNTER (OUTPATIENT)
Dept: MRI IMAGING | Age: 44
Discharge: HOME | End: 2024-10-26
Attending: PSYCHIATRY & NEUROLOGY
Payer: MEDICARE

## 2024-10-24 ENCOUNTER — HOSPITAL ENCOUNTER (OUTPATIENT)
Dept: NEUROLOGY | Age: 44
Discharge: HOME | End: 2024-10-24
Attending: PSYCHIATRY & NEUROLOGY
Payer: MEDICARE

## 2024-10-24 DIAGNOSIS — G43.709 CHRONIC MIGRAINE W/O AURA W/O STATUS MIGRAINOSUS, NOT INTRACTABLE: ICD-10-CM

## 2024-10-24 DIAGNOSIS — K14.8 TONGUE BITING: ICD-10-CM

## 2024-10-24 PROCEDURE — 70553 MRI BRAIN STEM W/O & W/DYE: CPT

## 2024-10-24 PROCEDURE — A9579 GAD-BASE MR CONTRAST NOS,1ML: HCPCS | Performed by: RADIOLOGY

## 2024-10-24 PROCEDURE — 95819 EEG AWAKE AND ASLEEP: CPT

## 2024-10-24 PROCEDURE — 6360000004 HC RX CONTRAST MEDICATION: Performed by: RADIOLOGY

## 2024-10-24 RX ADMIN — GADOTERIDOL 13 ML: 279.3 INJECTION, SOLUTION INTRAVENOUS at 08:21

## 2024-12-06 ENCOUNTER — OFFICE VISIT (OUTPATIENT)
Age: 44
End: 2024-12-06
Payer: MEDICARE

## 2024-12-06 VITALS
WEIGHT: 140.7 LBS | SYSTOLIC BLOOD PRESSURE: 129 MMHG | DIASTOLIC BLOOD PRESSURE: 85 MMHG | HEIGHT: 64 IN | BODY MASS INDEX: 24.02 KG/M2 | HEART RATE: 69 BPM

## 2024-12-06 DIAGNOSIS — G47.30 SLEEP DISORDER BREATHING: ICD-10-CM

## 2024-12-06 DIAGNOSIS — G43.709 CHRONIC MIGRAINE W/O AURA W/O STATUS MIGRAINOSUS, NOT INTRACTABLE: Primary | ICD-10-CM

## 2024-12-06 DIAGNOSIS — K14.8 TONGUE BITING: ICD-10-CM

## 2024-12-06 DIAGNOSIS — G44.40 REBOUND HEADACHE: ICD-10-CM

## 2024-12-06 PROCEDURE — G8427 DOCREV CUR MEDS BY ELIG CLIN: HCPCS | Performed by: PSYCHIATRY & NEUROLOGY

## 2024-12-06 PROCEDURE — 1036F TOBACCO NON-USER: CPT | Performed by: PSYCHIATRY & NEUROLOGY

## 2024-12-06 PROCEDURE — G8420 CALC BMI NORM PARAMETERS: HCPCS | Performed by: PSYCHIATRY & NEUROLOGY

## 2024-12-06 PROCEDURE — 99214 OFFICE O/P EST MOD 30 MIN: CPT | Performed by: PSYCHIATRY & NEUROLOGY

## 2024-12-06 PROCEDURE — G8484 FLU IMMUNIZE NO ADMIN: HCPCS | Performed by: PSYCHIATRY & NEUROLOGY

## 2024-12-06 RX ORDER — MAGNESIUM GLUCONATE 27 MG(500)
500 TABLET ORAL DAILY
Qty: 30 TABLET | Refills: 3 | Status: SHIPPED | OUTPATIENT
Start: 2024-12-06

## 2024-12-06 RX ORDER — RIBOFLAVIN (VITAMIN B2) 100 MG
200 TABLET ORAL 2 TIMES DAILY
Qty: 120 TABLET | Refills: 3 | Status: SHIPPED | OUTPATIENT
Start: 2024-12-06

## 2024-12-06 RX ORDER — RIZATRIPTAN BENZOATE 10 MG/1
10 TABLET ORAL PRN
Qty: 9 TABLET | Refills: 3 | Status: SHIPPED | OUTPATIENT
Start: 2024-12-06

## 2024-12-06 RX ORDER — PROPRANOLOL HYDROCHLORIDE 10 MG/1
10 TABLET ORAL 2 TIMES DAILY
Qty: 60 TABLET | Refills: 3 | Status: SHIPPED | OUTPATIENT
Start: 2024-12-06

## 2024-12-06 NOTE — PATIENT INSTRUCTIONS
Take inderal twice a day      Continue b2/mag      Use maxalt for severe headaches only, < 2 days a week      Need a sleep study      Maintain a headache calendar       Edilma Mccallum MD

## 2024-12-06 NOTE — PROGRESS NOTES
Edilma Mccallum MD       Barby Pyle is a 44 y.o. female presenting as a follow patient for a   Chief Complaint   Patient presents with    Follow-up        Tongue bite:     2 episodes of tongue bite  In sleep    No recurrence      Progression: had an episode of dizziness, felt like she was going to pass out  Took some sugar.  Fell down  No loc      Now: Has episodes of bite marks in inside of mouth  No nocturnal incontinence.   No falls from bed  But sleeps alone  No witnessed LOC episodes.       Witnesses have told her she stops breathing.   Not feeling rested on waking up  No naps in am     Mri brain: nml    Eeg: nml         Headaches:  Chronicity: chronic         Onset: chronic   Progression since onset: worsening  Not taken inderal    On b2/mag      Q daily headaches  Comes daily  Wakes up with it  Goes to bed with it  Pressure like pain  Pressure like and throbbing pain  8/10     Severe migraine- imitrex prn does not help  Needs to go to ER  Has photophobia, phonophobia  Needs to go to bed  Has nausea  Vomiting- none     Dizziness: yes.   No syncope     Visual auras: none  Sensory auras: none     Treatment: topamax 25 mg qhs- does not want to try   On albuterol- ashthma    On b2/mag      Maxalt prn              Past Medical History:   Diagnosis Date    Acute appendicitis 10/28/2019    Asthma     Headache(784.0)     8 to 10/10 severity, forehead    Iron deficiency anemia secondary to inadequate dietary iron intake     Migraine     8 to 10/10 severity    Migraine      Past Surgical History:   Procedure Laterality Date    APPENDECTOMY       SECTION  2006    LAPAROSCOPIC APPENDECTOMY N/A 10/28/2019    APPENDECTOMY LAPAROSCOPIC performed by Nikita GOLDBERG MD at [facility] OR    UPPER GASTROINTESTINAL ENDOSCOPY  2012     Social History     Socioeconomic History    Marital status: Single     Spouse name: None    Number of children: 2    Years of education: None    Highest education level:

## 2025-01-05 ENCOUNTER — HOSPITAL ENCOUNTER (OUTPATIENT)
Dept: SLEEP CENTER | Age: 45
Discharge: HOME | End: 2025-01-05
Payer: MEDICARE

## 2025-01-05 DIAGNOSIS — G47.30 SLEEP DISORDER BREATHING: ICD-10-CM

## 2025-01-05 PROCEDURE — 95810 POLYSOM 6/> YRS 4/> PARAM: CPT

## 2025-01-06 VITALS
WEIGHT: 130 LBS | SYSTOLIC BLOOD PRESSURE: 130 MMHG | HEIGHT: 64 IN | HEART RATE: 59 BPM | DIASTOLIC BLOOD PRESSURE: 80 MMHG | BODY MASS INDEX: 22.2 KG/M2 | OXYGEN SATURATION: 98 %

## 2025-01-06 ASSESSMENT — SLEEP AND FATIGUE QUESTIONNAIRES
HOW LIKELY ARE YOU TO NOD OFF OR FALL ASLEEP WHILE SITTING QUIETLY AFTER LUNCH WITHOUT ALCOHOL: WOULD NEVER DOZE
HOW LIKELY ARE YOU TO NOD OFF OR FALL ASLEEP WHILE SITTING AND READING: WOULD NEVER DOZE
HOW LIKELY ARE YOU TO NOD OFF OR FALL ASLEEP WHILE WATCHING TV: WOULD NEVER DOZE
HOW LIKELY ARE YOU TO NOD OFF OR FALL ASLEEP IN A CAR, WHILE STOPPED FOR A FEW MINUTES IN TRAFFIC: WOULD NEVER DOZE
HOW LIKELY ARE YOU TO NOD OFF OR FALL ASLEEP WHILE LYING DOWN TO REST IN THE AFTERNOON WHEN CIRCUMSTANCES PERMIT: WOULD NEVER DOZE
HOW LIKELY ARE YOU TO NOD OFF OR FALL ASLEEP WHEN YOU ARE A PASSENGER IN A CAR FOR AN HOUR WITHOUT A BREAK: WOULD NEVER DOZE
HOW LIKELY ARE YOU TO NOD OFF OR FALL ASLEEP WHILE SITTING AND TALKING TO SOMEONE: WOULD NEVER DOZE
ESS TOTAL SCORE: 0
HOW LIKELY ARE YOU TO NOD OFF OR FALL ASLEEP WHILE SITTING INACTIVE IN A PUBLIC PLACE: WOULD NEVER DOZE

## 2025-01-29 ENCOUNTER — OFFICE VISIT (OUTPATIENT)
Dept: INTERNAL MEDICINE | Age: 45
End: 2025-01-29
Payer: MEDICARE

## 2025-01-29 VITALS
SYSTOLIC BLOOD PRESSURE: 114 MMHG | HEIGHT: 64 IN | WEIGHT: 141.7 LBS | OXYGEN SATURATION: 97 % | RESPIRATION RATE: 20 BRPM | HEART RATE: 62 BPM | DIASTOLIC BLOOD PRESSURE: 75 MMHG | TEMPERATURE: 97 F | BODY MASS INDEX: 24.19 KG/M2

## 2025-01-29 DIAGNOSIS — E78.5 HYPERLIPIDEMIA, UNSPECIFIED HYPERLIPIDEMIA TYPE: ICD-10-CM

## 2025-01-29 DIAGNOSIS — G43.909 MIGRAINE WITHOUT STATUS MIGRAINOSUS, NOT INTRACTABLE, UNSPECIFIED MIGRAINE TYPE: ICD-10-CM

## 2025-01-29 DIAGNOSIS — S46.912A UPPER EXTREMITY TENDON TEAR, LEFT, INITIAL ENCOUNTER: ICD-10-CM

## 2025-01-29 DIAGNOSIS — J45.909 ASTHMA, UNSPECIFIED ASTHMA SEVERITY, UNSPECIFIED WHETHER COMPLICATED, UNSPECIFIED WHETHER PERSISTENT: Primary | ICD-10-CM

## 2025-01-29 DIAGNOSIS — K29.70 GASTRITIS WITHOUT BLEEDING, UNSPECIFIED CHRONICITY, UNSPECIFIED GASTRITIS TYPE: ICD-10-CM

## 2025-01-29 DIAGNOSIS — E55.9 VITAMIN D DEFICIENCY: ICD-10-CM

## 2025-01-29 LAB
ALBUMIN: 4.2 G/DL (ref 3.5–5.2)
ALP BLD-CCNC: 66 U/L (ref 35–104)
ALT SERPL-CCNC: 12 U/L (ref 0–32)
ANION GAP SERPL CALCULATED.3IONS-SCNC: 9 MMOL/L (ref 7–16)
AST SERPL-CCNC: 15 U/L (ref 0–31)
BASOPHILS ABSOLUTE: 0.06 K/UL (ref 0–0.2)
BASOPHILS RELATIVE PERCENT: 1 % (ref 0–2)
BILIRUB SERPL-MCNC: 0.4 MG/DL (ref 0–1.2)
BUN BLDV-MCNC: 7 MG/DL (ref 6–20)
CALCIUM SERPL-MCNC: 9.2 MG/DL (ref 8.6–10.2)
CHLORIDE BLD-SCNC: 102 MMOL/L (ref 98–107)
CHOLESTEROL, TOTAL: 243 MG/DL
CO2: 28 MMOL/L (ref 22–29)
CREAT SERPL-MCNC: 0.7 MG/DL (ref 0.5–1)
EOSINOPHILS ABSOLUTE: 0.09 K/UL (ref 0.05–0.5)
EOSINOPHILS RELATIVE PERCENT: 1 % (ref 0–6)
GFR, ESTIMATED: >90 ML/MIN/1.73M2
GLUCOSE BLD-MCNC: 93 MG/DL (ref 74–99)
HCT VFR BLD CALC: 46.7 % (ref 34–48)
HDLC SERPL-MCNC: 49 MG/DL
HEMOGLOBIN: 15 G/DL (ref 11.5–15.5)
IMMATURE GRANULOCYTES %: 0 % (ref 0–5)
IMMATURE GRANULOCYTES ABSOLUTE: <0.03 K/UL (ref 0–0.58)
LDL CHOLESTEROL: 173 MG/DL
LYMPHOCYTES ABSOLUTE: 2.77 K/UL (ref 1.5–4)
LYMPHOCYTES RELATIVE PERCENT: 35 % (ref 20–42)
MCH RBC QN AUTO: 29.2 PG (ref 26–35)
MCHC RBC AUTO-ENTMCNC: 32.1 G/DL (ref 32–34.5)
MCV RBC AUTO: 90.9 FL (ref 80–99.9)
MONOCYTES ABSOLUTE: 0.68 K/UL (ref 0.1–0.95)
MONOCYTES RELATIVE PERCENT: 9 % (ref 2–12)
NEUTROPHILS ABSOLUTE: 4.42 K/UL (ref 1.8–7.3)
NEUTROPHILS RELATIVE PERCENT: 55 % (ref 43–80)
PDW BLD-RTO: 13.6 % (ref 11.5–15)
PLATELET # BLD: 335 K/UL (ref 130–450)
PMV BLD AUTO: 10.3 FL (ref 7–12)
POTASSIUM SERPL-SCNC: 4.1 MMOL/L (ref 3.5–5)
RBC # BLD: 5.14 M/UL (ref 3.5–5.5)
SODIUM BLD-SCNC: 139 MMOL/L (ref 132–146)
TOTAL PROTEIN: 7.4 G/DL (ref 6.4–8.3)
TRIGL SERPL-MCNC: 106 MG/DL
VITAMIN D 25-HYDROXY: 35.2 NG/ML (ref 30–100)
VLDLC SERPL CALC-MCNC: 21 MG/DL
WBC # BLD: 8 K/UL (ref 4.5–11.5)

## 2025-01-29 PROCEDURE — 36415 COLL VENOUS BLD VENIPUNCTURE: CPT | Performed by: INTERNAL MEDICINE

## 2025-01-29 PROCEDURE — 99212 OFFICE O/P EST SF 10 MIN: CPT

## 2025-01-29 RX ORDER — PANTOPRAZOLE SODIUM 40 MG/1
TABLET, DELAYED RELEASE ORAL
Qty: 90 TABLET | Refills: 1 | Status: SHIPPED | OUTPATIENT
Start: 2025-01-29

## 2025-01-29 RX ORDER — TOPIRAMATE 25 MG/1
25 TABLET, FILM COATED ORAL 2 TIMES DAILY
Qty: 180 TABLET | Refills: 1 | Status: CANCELLED | OUTPATIENT
Start: 2025-01-29

## 2025-01-29 RX ORDER — CHOLECALCIFEROL (VITAMIN D3) 25 MCG
1000 TABLET ORAL DAILY
Qty: 90 TABLET | Refills: 1 | Status: SHIPPED | OUTPATIENT
Start: 2025-01-29

## 2025-01-29 RX ORDER — CHLORAL HYDRATE 500 MG
1000 CAPSULE ORAL DAILY
Qty: 90 CAPSULE | Refills: 1 | Status: SHIPPED | OUTPATIENT
Start: 2025-01-29

## 2025-01-29 SDOH — ECONOMIC STABILITY: INCOME INSECURITY: IN THE LAST 12 MONTHS, WAS THERE A TIME WHEN YOU WERE NOT ABLE TO PAY THE MORTGAGE OR RENT ON TIME?: NO

## 2025-01-29 SDOH — ECONOMIC STABILITY: FOOD INSECURITY: WITHIN THE PAST 12 MONTHS, YOU WORRIED THAT YOUR FOOD WOULD RUN OUT BEFORE YOU GOT MONEY TO BUY MORE.: NEVER TRUE

## 2025-01-29 SDOH — ECONOMIC STABILITY: FOOD INSECURITY: WITHIN THE PAST 12 MONTHS, THE FOOD YOU BOUGHT JUST DIDN'T LAST AND YOU DIDN'T HAVE MONEY TO GET MORE.: NEVER TRUE

## 2025-01-29 SDOH — ECONOMIC STABILITY: TRANSPORTATION INSECURITY
IN THE PAST 12 MONTHS, HAS LACK OF TRANSPORTATION KEPT YOU FROM MEETINGS, WORK, OR FROM GETTING THINGS NEEDED FOR DAILY LIVING?: NO

## 2025-01-29 SDOH — ECONOMIC STABILITY: TRANSPORTATION INSECURITY
IN THE PAST 12 MONTHS, HAS THE LACK OF TRANSPORTATION KEPT YOU FROM MEDICAL APPOINTMENTS OR FROM GETTING MEDICATIONS?: NO

## 2025-01-29 ASSESSMENT — LIFESTYLE VARIABLES
HOW OFTEN DO YOU HAVE A DRINK CONTAINING ALCOHOL: NEVER
HOW MANY STANDARD DRINKS CONTAINING ALCOHOL DO YOU HAVE ON A TYPICAL DAY: PATIENT DOES NOT DRINK

## 2025-01-29 ASSESSMENT — PATIENT HEALTH QUESTIONNAIRE - PHQ9
1. LITTLE INTEREST OR PLEASURE IN DOING THINGS: NOT AT ALL
SUM OF ALL RESPONSES TO PHQ QUESTIONS 1-9: 0
SUM OF ALL RESPONSES TO PHQ9 QUESTIONS 1 & 2: 0
SUM OF ALL RESPONSES TO PHQ QUESTIONS 1-9: 0
2. FEELING DOWN, DEPRESSED OR HOPELESS: NOT AT ALL
2. FEELING DOWN, DEPRESSED OR HOPELESS: NOT AT ALL
1. LITTLE INTEREST OR PLEASURE IN DOING THINGS: NOT AT ALL
SUM OF ALL RESPONSES TO PHQ9 QUESTIONS 1 & 2: 0

## 2025-01-29 ASSESSMENT — ENCOUNTER SYMPTOMS
COUGH: 0
ABDOMINAL PAIN: 0
VOMITING: 0
CHEST TIGHTNESS: 0
NAUSEA: 0
SHORTNESS OF BREATH: 0

## 2025-01-29 NOTE — ASSESSMENT & PLAN NOTE
:: Currently on albuterol 108 mcg/act Q4H PRN only - states that she uses if occasionally, <1 per week. Last use 2 weeks prior.  :: No PFT's on file  :: Patient is a former tobacco smoker, quit 18 years prior  - Continue albuterol PRN  - Consider PFT's  Orders:    CBC with Auto Differential; Future    CBC with Auto Differential

## 2025-01-29 NOTE — PROGRESS NOTES
Barby Pyle (:  1980) is a 44 y.o. female,Established patient, here for evaluation of the following chief complaint(s):  OTHER (Patient wants to be checked for H pylori. Patient's partner is currently being treated.. Patient is not having any symptoms.) and Seizures (Patient has  had  2 recent seizures. Patient is currently being treated )    Patient was last seen in our clinic on 2024.       Assessment & Plan  Migraine without status migrainosus, not intractable, unspecified migraine type  :: Followed by Dr. Mccallum (Neurology), last visit 2024  :: Baseline Sleep Study done on 2025  :: Current regimen: propranolol 10 mg BID  **on chart, on topiramate - but patient not taking it  :: Currently on rizatriptan 10 mg PRN - states that she has only used it once   Started   :: On vitamin B-2 and magnesium supplements  - Hold topiramate for now  - Continue propranolol, rizatriptan PRN, and supplements  - Continue follow-up with Neurology as scheduled (Q3 months)       Hyperlipidemia, unspecified hyperlipidemia type   10/05/22 09:27   Cholesterol, Total 215 (H)   HDL Cholesterol 56   LDL Cholesterol 145 (H)   Triglycerides 68   VLDL 14   (H): Data is abnormally high  - Order Lipid Profile, CMP  Orders:    Omega-3 Fatty Acids (FISH OIL) 1000 MG capsule; Take 1 capsule by mouth daily    Comprehensive Metabolic Panel; Future    Lipid Panel; Future    Lipid Panel    Comprehensive Metabolic Panel    Asthma, unspecified asthma severity, unspecified whether complicated, unspecified whether persistent  :: Currently on albuterol 108 mcg/act Q4H PRN only - states that she uses if occasionally, <1 per week. Last use 2 weeks prior.  :: No PFT's on file  :: Patient is a former tobacco smoker, quit 18 years prior  - Continue albuterol PRN  - Consider PFT's  Orders:    CBC with Auto Differential; Future    CBC with Auto Differential    Gastritis without bleeding, unspecified chronicity, unspecified

## 2025-01-29 NOTE — ASSESSMENT & PLAN NOTE
10/05/22 09:27   Cholesterol, Total 215 (H)   HDL Cholesterol 56   LDL Cholesterol 145 (H)   Triglycerides 68   VLDL 14   (H): Data is abnormally high  - Order Lipid Profile, CMP  Orders:    Omega-3 Fatty Acids (FISH OIL) 1000 MG capsule; Take 1 capsule by mouth daily    Comprehensive Metabolic Panel; Future    Lipid Panel; Future    Lipid Panel    Comprehensive Metabolic Panel

## 2025-01-29 NOTE — ASSESSMENT & PLAN NOTE
:: Followed by Dr. Mccallum (Neurology), last visit 12/6/2024  :: Baseline Sleep Study done on 1/5/2025  :: Current regimen: propranolol 10 mg BID  **on chart, on topiramate - but patient not taking it  :: Currently on rizatriptan 10 mg PRN - states that she has only used it once   Started 12/6  :: On vitamin B-2 and magnesium supplements  - Hold topiramate for now  - Continue propranolol, rizatriptan PRN, and supplements  - Continue follow-up with Neurology as scheduled (Q3 months)

## 2025-03-12 ENCOUNTER — TELEMEDICINE (OUTPATIENT)
Age: 45
End: 2025-03-12
Payer: MEDICARE

## 2025-03-12 DIAGNOSIS — G43.709 CHRONIC MIGRAINE W/O AURA W/O STATUS MIGRAINOSUS, NOT INTRACTABLE: Primary | ICD-10-CM

## 2025-03-12 DIAGNOSIS — G47.30 SLEEP DISORDER BREATHING: ICD-10-CM

## 2025-03-12 DIAGNOSIS — R51.9 NOCTURNAL HEADACHES: ICD-10-CM

## 2025-03-12 PROCEDURE — G8427 DOCREV CUR MEDS BY ELIG CLIN: HCPCS | Performed by: PSYCHIATRY & NEUROLOGY

## 2025-03-12 PROCEDURE — 1036F TOBACCO NON-USER: CPT | Performed by: PSYCHIATRY & NEUROLOGY

## 2025-03-12 PROCEDURE — G8420 CALC BMI NORM PARAMETERS: HCPCS | Performed by: PSYCHIATRY & NEUROLOGY

## 2025-03-12 PROCEDURE — 99214 OFFICE O/P EST MOD 30 MIN: CPT | Performed by: PSYCHIATRY & NEUROLOGY

## 2025-03-12 RX ORDER — MAGNESIUM GLUCONATE 27 MG(500)
500 TABLET ORAL DAILY
Qty: 30 TABLET | Refills: 3 | Status: SHIPPED | OUTPATIENT
Start: 2025-03-12

## 2025-03-12 RX ORDER — RIBOFLAVIN (VITAMIN B2) 100 MG
200 TABLET ORAL 2 TIMES DAILY
Qty: 120 TABLET | Refills: 3 | Status: SHIPPED | OUTPATIENT
Start: 2025-03-12

## 2025-03-12 RX ORDER — RIMEGEPANT SULFATE 75 MG/75MG
75 TABLET, ORALLY DISINTEGRATING ORAL PRN
Qty: 10 TABLET | Refills: 3 | Status: SHIPPED | OUTPATIENT
Start: 2025-03-12

## 2025-03-12 RX ORDER — PROPRANOLOL HYDROCHLORIDE 10 MG/1
20 TABLET ORAL 2 TIMES DAILY
Qty: 120 TABLET | Refills: 3 | Status: SHIPPED | OUTPATIENT
Start: 2025-03-12

## 2025-03-12 NOTE — PROGRESS NOTES
Edilma Mccallum MD       Barby Pyle is a 44 y.o. female presenting as a follow patient for a   Chief Complaint   Patient presents with    Follow-up      Tongue bite:    sleep study: no ANNA  Has snoring      2 episodes of tongue bite  In sleep     No recurrence        Progression: had an episode of dizziness, felt like she was going to pass out  Took some sugar.  Fell down  No loc        Now: Has episodes of bite marks in inside of mouth  No nocturnal incontinence.   No falls from bed  But sleeps alone  No witnessed LOC episodes.           Mri brain: nml     Eeg: nml           Headaches:  Chronicity: chronic           Onset: chronic   Progression since onset: on inderal   Headaches better   2 weeks ago, headaches daily      On b2/mag      Q daily headaches  Comes daily  Wakes up with it  Goes to bed with it  Pressure like pain  Pressure like and throbbing pain  8/10     Severe migraine- vicks on head, slept  Maxalt did not help   Has photophobia, phonophobia  Needs to go to bed  Has nausea  Vomiting- none     Dizziness: yes.   No syncope     Visual auras: none  Sensory auras: none     Treatment: topamax 25 mg qhs-d/torrie   On albuterol- ashthma     On b2/mag     On inderal 10 mg bid      Maxalt prn              Past Medical History:   Diagnosis Date    Acute appendicitis 10/28/2019    Asthma     Headache(784.0)     8 to 10/10 severity, forehead    Iron deficiency anemia secondary to inadequate dietary iron intake     Migraine     8 to 10/10 severity    Migraine      Past Surgical History:   Procedure Laterality Date    APPENDECTOMY       SECTION      LAPAROSCOPIC APPENDECTOMY N/A 10/28/2019    APPENDECTOMY LAPAROSCOPIC performed by Nikita GOLDBERG MD at [facility] OR    UPPER GASTROINTESTINAL ENDOSCOPY  2012     Social History     Socioeconomic History    Marital status: Single     Spouse name: None    Number of children: 2    Years of education: None    Highest education level: None

## 2025-03-24 ENCOUNTER — TELEPHONE (OUTPATIENT)
Age: 45
End: 2025-03-24

## 2025-03-24 NOTE — TELEPHONE ENCOUNTER
Pt phnd - inq on results of MRI & next steps; pt states migraines are still the same with no change.  Please call pt to advise due to pt care [phone]

## 2025-03-27 RX ORDER — NORTRIPTYLINE HYDROCHLORIDE 50 MG/1
50 CAPSULE ORAL NIGHTLY
Qty: 30 CAPSULE | Refills: 1 | Status: SHIPPED | OUTPATIENT
Start: 2025-04-10

## 2025-03-27 RX ORDER — NORTRIPTYLINE HYDROCHLORIDE 25 MG/1
25 CAPSULE ORAL NIGHTLY
Qty: 14 CAPSULE | Refills: 0 | Status: SHIPPED | OUTPATIENT
Start: 2025-03-27 | End: 2025-04-10

## 2025-03-27 NOTE — TELEPHONE ENCOUNTER
Mri brain was nml    Eeg was ok      If headaches still ongoing, add pamelor      The following medication has been approved and sent to your pharmacy    Requested Prescriptions     Signed Prescriptions Disp Refills    nortriptyline (PAMELOR) 25 MG capsule 14 capsule 0     Sig: Take 1 capsule by mouth nightly for 14 days     Authorizing Provider: SOLANGE READ    nortriptyline (PAMELOR) 50 MG capsule 30 capsule 1     Sig: Take 1 capsule by mouth nightly     Authorizing Provider: SOLANGE READ MD

## 2025-08-18 ENCOUNTER — HOSPITAL ENCOUNTER (EMERGENCY)
Age: 45
Discharge: HOME | End: 2025-08-18
Attending: EMERGENCY MEDICINE
Payer: MEDICARE

## 2025-08-18 VITALS
DIASTOLIC BLOOD PRESSURE: 69 MMHG | HEART RATE: 79 BPM | RESPIRATION RATE: 16 BRPM | OXYGEN SATURATION: 95 % | BODY MASS INDEX: 23.05 KG/M2 | SYSTOLIC BLOOD PRESSURE: 118 MMHG | TEMPERATURE: 97.8 F | WEIGHT: 135 LBS | HEIGHT: 64 IN

## 2025-08-18 DIAGNOSIS — R19.7 DIARRHEA, UNSPECIFIED TYPE: Primary | ICD-10-CM

## 2025-08-18 LAB
ALBUMIN SERPL-MCNC: 3.9 G/DL (ref 3.5–5.2)
ALP SERPL-CCNC: 77 U/L (ref 35–104)
ALT SERPL-CCNC: 14 U/L (ref 0–35)
ANION GAP SERPL CALCULATED.3IONS-SCNC: 11 MMOL/L (ref 7–16)
AST SERPL-CCNC: 17 U/L (ref 0–35)
BASOPHILS # BLD: 0.05 K/UL (ref 0–0.2)
BASOPHILS NFR BLD: 0 % (ref 0–2)
BILIRUB SERPL-MCNC: 0.3 MG/DL (ref 0–1.2)
BUN SERPL-MCNC: 4 MG/DL (ref 6–20)
CALCIUM SERPL-MCNC: 8.8 MG/DL (ref 8.6–10)
CHLORIDE SERPL-SCNC: 100 MMOL/L (ref 98–107)
CO2 SERPL-SCNC: 25 MMOL/L (ref 22–29)
CREAT SERPL-MCNC: 0.8 MG/DL (ref 0.5–1)
EOSINOPHIL # BLD: 0.04 K/UL (ref 0.05–0.5)
EOSINOPHILS RELATIVE PERCENT: 0 % (ref 0–6)
ERYTHROCYTE [DISTWIDTH] IN BLOOD BY AUTOMATED COUNT: 14.3 % (ref 11.5–15)
GFR, ESTIMATED: >90 ML/MIN/1.73M2
GLUCOSE SERPL-MCNC: 101 MG/DL (ref 74–99)
HCT VFR BLD AUTO: 46.6 % (ref 34–48)
HGB BLD-MCNC: 15.1 G/DL (ref 11.5–15.5)
IMM GRANULOCYTES # BLD AUTO: 0.06 K/UL (ref 0–0.58)
IMM GRANULOCYTES NFR BLD: 0 % (ref 0–5)
LIPASE SERPL-CCNC: 22 U/L (ref 13–60)
LYMPHOCYTES NFR BLD: 2 K/UL (ref 1.5–4)
LYMPHOCYTES RELATIVE PERCENT: 14 % (ref 20–42)
MCH RBC QN AUTO: 29.1 PG (ref 26–35)
MCHC RBC AUTO-ENTMCNC: 32.4 G/DL (ref 32–34.5)
MCV RBC AUTO: 89.8 FL (ref 80–99.9)
MONOCYTES NFR BLD: 1.64 K/UL (ref 0.1–0.95)
MONOCYTES NFR BLD: 12 % (ref 2–12)
NEUTROPHILS NFR BLD: 73 % (ref 43–80)
NEUTS SEG NFR BLD: 10.44 K/UL (ref 1.8–7.3)
PLATELET # BLD AUTO: 252 K/UL (ref 130–450)
PMV BLD AUTO: 10.2 FL (ref 7–12)
POTASSIUM SERPL-SCNC: 3.6 MMOL/L (ref 3.5–5.1)
PROT SERPL-MCNC: 7 G/DL (ref 6.4–8.3)
RBC # BLD AUTO: 5.19 M/UL (ref 3.5–5.5)
RBC # BLD: NORMAL 10*6/UL
SODIUM SERPL-SCNC: 136 MMOL/L (ref 136–145)
WBC OTHER # BLD: 14.2 K/UL (ref 4.5–11.5)

## 2025-08-18 PROCEDURE — 2580000003 HC RX 258: Performed by: EMERGENCY MEDICINE

## 2025-08-18 PROCEDURE — 6370000000 HC RX 637 (ALT 250 FOR IP): Performed by: EMERGENCY MEDICINE

## 2025-08-18 PROCEDURE — 85025 COMPLETE CBC W/AUTO DIFF WBC: CPT

## 2025-08-18 PROCEDURE — 80053 COMPREHEN METABOLIC PANEL: CPT

## 2025-08-18 PROCEDURE — 99284 EMERGENCY DEPT VISIT MOD MDM: CPT

## 2025-08-18 PROCEDURE — 83690 ASSAY OF LIPASE: CPT

## 2025-08-18 PROCEDURE — 96360 HYDRATION IV INFUSION INIT: CPT

## 2025-08-18 RX ORDER — 0.9 % SODIUM CHLORIDE 0.9 %
2000 INTRAVENOUS SOLUTION INTRAVENOUS ONCE
Status: COMPLETED | OUTPATIENT
Start: 2025-08-18 | End: 2025-08-18

## 2025-08-18 RX ORDER — LOPERAMIDE HYDROCHLORIDE 2 MG/1
2 CAPSULE ORAL 4 TIMES DAILY PRN
Qty: 24 CAPSULE | Refills: 0 | Status: SHIPPED | OUTPATIENT
Start: 2025-08-18 | End: 2025-08-28

## 2025-08-18 RX ORDER — LOPERAMIDE HYDROCHLORIDE 2 MG/1
2 CAPSULE ORAL ONCE
Status: COMPLETED | OUTPATIENT
Start: 2025-08-18 | End: 2025-08-18

## 2025-08-18 RX ORDER — ONDANSETRON 2 MG/ML
4 INJECTION INTRAMUSCULAR; INTRAVENOUS ONCE
Status: DISCONTINUED | OUTPATIENT
Start: 2025-08-18 | End: 2025-08-18

## 2025-08-18 RX ADMIN — LOPERAMIDE HYDROCHLORIDE 2 MG: 2 CAPSULE ORAL at 16:57

## 2025-08-18 RX ADMIN — SODIUM CHLORIDE 2000 ML: 0.9 INJECTION, SOLUTION INTRAVENOUS at 15:44
